# Patient Record
Sex: FEMALE | Race: WHITE | Employment: OTHER | URBAN - METROPOLITAN AREA
[De-identification: names, ages, dates, MRNs, and addresses within clinical notes are randomized per-mention and may not be internally consistent; named-entity substitution may affect disease eponyms.]

---

## 2017-07-29 ENCOUNTER — APPOINTMENT (OUTPATIENT)
Dept: GENERAL RADIOLOGY | Age: 52
End: 2017-07-29
Attending: EMERGENCY MEDICINE
Payer: COMMERCIAL

## 2017-07-29 ENCOUNTER — HOSPITAL ENCOUNTER (EMERGENCY)
Age: 52
Discharge: HOME | End: 2017-07-29
Attending: EMERGENCY MEDICINE
Payer: COMMERCIAL

## 2017-07-29 VITALS
HEART RATE: 68 BPM | HEIGHT: 62 IN | TEMPERATURE: 99 F | WEIGHT: 137 LBS | SYSTOLIC BLOOD PRESSURE: 123 MMHG | BODY MASS INDEX: 25.21 KG/M2 | DIASTOLIC BLOOD PRESSURE: 79 MMHG | RESPIRATION RATE: 16 BRPM
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
## 2017-07-29 PROCEDURE — 99283 EMERGENCY DEPT VISIT LOW MDM: CPT

## 2017-07-29 PROCEDURE — 90471 IMMUNIZATION ADMIN: CPT

## 2017-07-29 PROCEDURE — 73130 X-RAY EXAM OF HAND: CPT | Performed by: EMERGENCY MEDICINE

## 2017-07-29 RX ORDER — AMOXICILLIN AND CLAVULANATE POTASSIUM 875; 125 MG/1; MG/1
1 TABLET, FILM COATED ORAL ONCE
Status: COMPLETED | OUTPATIENT
Start: 2017-07-29 | End: 2017-07-29

## 2017-07-29 RX ORDER — HYDROCODONE BITARTRATE AND ACETAMINOPHEN 5; 325 MG/1; MG/1
2 TABLET ORAL ONCE
Status: COMPLETED | OUTPATIENT
Start: 2017-07-29 | End: 2017-07-29

## 2017-07-29 RX ORDER — HYDROCODONE BITARTRATE AND ACETAMINOPHEN 5; 325 MG/1; MG/1
1-2 TABLET ORAL EVERY 4 HOURS PRN
Qty: 20 TABLET | Refills: 0 | Status: SHIPPED | OUTPATIENT
Start: 2017-07-29 | End: 2018-02-19 | Stop reason: ALTCHOICE

## 2017-07-29 RX ORDER — AMOXICILLIN AND CLAVULANATE POTASSIUM 875; 125 MG/1; MG/1
1 TABLET, FILM COATED ORAL 2 TIMES DAILY
Qty: 14 TABLET | Refills: 0 | Status: SHIPPED | OUTPATIENT
Start: 2017-07-29 | End: 2017-08-05

## 2017-07-29 NOTE — ED PROVIDER NOTES
Patient Seen in: THE [facility] In [place]    History   Patient presents with:  Bite (integumentary)    Stated Complaint: dog bite left hand     HPI    25-year-old female comes to the hospital with a complaint of having difficulty with ha has multiple puncture wounds to the palmar and extensor surface of her hand. Approximately 1 cm in length ×2 on the top and 2 on the palmar surface. Range of motion is spared but it is painful while she does so.   There is no erythema, pus or drainage not Disposition and Plan     Clinical Impression:  Dog bite, initial encounter  (primary encounter diagnosis)    Disposition:  Discharge    Follow-up:  Lucille Wright  [address] [facility] [phone]    Schedule a

## 2022-08-19 ENCOUNTER — HOSPITAL ENCOUNTER (OUTPATIENT)
Age: 57
Discharge: HOME | End: 2022-08-19
Payer: OTHER MISCELLANEOUS

## 2022-08-19 VITALS
DIASTOLIC BLOOD PRESSURE: 77 MMHG | SYSTOLIC BLOOD PRESSURE: 115 MMHG | HEART RATE: 66 BPM | RESPIRATION RATE: 16 BRPM | WEIGHT: 155 LBS | TEMPERATURE: 97 F | OXYGEN SATURATION: 99 % | BODY MASS INDEX: 30.43 KG/M2 | HEIGHT: 60 IN
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
## 2022-08-19 RX ORDER — CEPHALEXIN 500 MG/1
500 CAPSULE ORAL 4 TIMES DAILY
Qty: 40 CAPSULE | Refills: 0 | Status: SHIPPED | OUTPATIENT
Start: 2022-08-19 | End: 2022-08-29

## 2022-08-19 NOTE — ED INITIAL ASSESSMENT (HPI)
Pt states Tuesday she got stung on her left inner upper arm by possible a wasp. Every day the area of redness has gotten bigger and is now warm to touch.

## (undated) NOTE — ED AVS SNAPSHOT
Lai Negron   MRN: [id number]    Department:  [facility] in [place]   Date of Visit:  7/29/2017           Disclosure     Insurance plans vary and the physician(s) referred by the ER may not be covered by your plan.  Please conta If you have been prescribed any medication(s), please fill your prescription right away and begin taking the medication(s) as directed    If the emergency physician has read X-rays, these will be re-interpreted by a radiologist.  If there is a significant